# Patient Record
Sex: FEMALE | Race: BLACK OR AFRICAN AMERICAN | Employment: UNEMPLOYED | URBAN - METROPOLITAN AREA
[De-identification: names, ages, dates, MRNs, and addresses within clinical notes are randomized per-mention and may not be internally consistent; named-entity substitution may affect disease eponyms.]

---

## 2023-01-16 ENCOUNTER — APPOINTMENT (OUTPATIENT)
Dept: GENERAL RADIOLOGY | Age: 38
End: 2023-01-16

## 2023-01-16 ENCOUNTER — HOSPITAL ENCOUNTER (EMERGENCY)
Age: 38
Discharge: LEFT BEFORE DISCHARGE | End: 2023-01-16

## 2023-01-16 VITALS
SYSTOLIC BLOOD PRESSURE: 121 MMHG | RESPIRATION RATE: 18 BRPM | HEART RATE: 87 BPM | DIASTOLIC BLOOD PRESSURE: 70 MMHG | TEMPERATURE: 98.3 F | OXYGEN SATURATION: 100 %

## 2023-01-16 DIAGNOSIS — R51.9 FREQUENT HEADACHES: ICD-10-CM

## 2023-01-16 DIAGNOSIS — R30.0 DYSURIA: ICD-10-CM

## 2023-01-16 DIAGNOSIS — M25.551 RIGHT HIP PAIN: Primary | ICD-10-CM

## 2023-01-16 LAB
BILIRUBIN URINE: NEGATIVE MG/DL
BLOOD, URINE: NEGATIVE
CLARITY: CLEAR
COLOR: YELLOW
GLUCOSE, URINE: NEGATIVE MG/DL
INTERPRETATION: NORMAL
KETONES, URINE: NEGATIVE MG/DL
LEUKOCYTE ESTERASE, URINE: NEGATIVE
NITRITE URINE, QUANTITATIVE: NEGATIVE
PH, URINE: 7 (ref 5–8)
PREGNANCY, URINE: NEGATIVE
PROTEIN UA: NEGATIVE MG/DL
SPECIFIC GRAVITY UA: 1.01 (ref 1–1.03)
SPECIFIC GRAVITY, URINE: 1.01 (ref 1–1.03)
UROBILINOGEN, URINE: NORMAL MG/DL (ref 0.2–1)

## 2023-01-16 PROCEDURE — 73502 X-RAY EXAM HIP UNI 2-3 VIEWS: CPT

## 2023-01-16 PROCEDURE — 81003 URINALYSIS AUTO W/O SCOPE: CPT

## 2023-01-16 PROCEDURE — 99284 EMERGENCY DEPT VISIT MOD MDM: CPT

## 2023-01-16 PROCEDURE — 87491 CHLMYD TRACH DNA AMP PROBE: CPT

## 2023-01-16 PROCEDURE — 6370000000 HC RX 637 (ALT 250 FOR IP): Performed by: PHYSICIAN ASSISTANT

## 2023-01-16 PROCEDURE — 87591 N.GONORRHOEAE DNA AMP PROB: CPT

## 2023-01-16 PROCEDURE — 81025 URINE PREGNANCY TEST: CPT

## 2023-01-16 RX ORDER — ACETAMINOPHEN 500 MG
1000 TABLET ORAL ONCE
Status: COMPLETED | OUTPATIENT
Start: 2023-01-16 | End: 2023-01-16

## 2023-01-16 RX ADMIN — ACETAMINOPHEN 1000 MG: 500 TABLET ORAL at 20:26

## 2023-01-16 ASSESSMENT — PAIN SCALES - GENERAL: PAINLEVEL_OUTOF10: 6

## 2023-01-16 NOTE — ED NOTES
Pt presents to ED due to c/o leg pain \"ever since she got her ankle monitor off\". Pt is also feeling fatigued and possible STD exposure.       Tresa Verdin RN  01/16/23 1640

## 2023-01-16 NOTE — ED NOTES
Right leg pain after ankle monitor removed 3 months ago and daily headaches     Kyree Camp, SIDRA  01/16/23 3837 FRANK Saravia RN  01/16/23 1397

## 2023-01-17 NOTE — ED PROVIDER NOTES
Emergency [address] EMERGENCY DEPARTMENT    Patient: Judith Chacon  MRN: [id number]  : 1985  Date of Evaluation: 2023  ED Provider: Matt Nieto PA-C    Chief Complaint       Chief Complaint   Patient presents with    Leg Pain    Headache    Exposure to STD       Matthew Gaspar is a 40 y.o. female who presents to the emergency department for multiple complaints. History obtained using  #[id number]. Patient complains first of headaches and body aches for the last 4-5 months. She states headache is frontal, worse at night, keeps her from sleeping. She has not taken any medications at home to try to alleviate it. Denies fever, chills, nasal congestion, sore throat, cough. Denies n/v/d. Second complaint is right hip pain. Onset was months ago after having her ankle monitor removed. Denies any fall or injury. Pain is over the lateral hip. Worse with lying on that side. Denies radiation down the leg, numbness, tingling. Last complaint is dysuria. Onset about a week ago. Denies vaginal bleeding or discharge. Denies frequency or urgency. Denies abd pain or flank pain. Her significant other is being seen for an STD check as well but patient tells me she doesn't understand what a sexually transmitted disease is or if she has any concerns for such. ROS     CONSTITUTIONAL:  Denies fever.  + body aches. HEAD:  + headache. ENT:  Denies earache, nasal congestion, sore throat. NECK:  Denies neck pain. RESPIRATORY:  Denies any shortness of breath. CARDIOVASCULAR:  Denies chest pain. GI:  Denies nausea or vomiting. :  + dysuria. MUSCULOSKELETAL:  + hip pain. BACK:  Denies back pain. INTEGUMENT:  Denies skin changes. LYMPHATIC:  Denies lymphadenopathy. NEUROLOGIC:  Denies any numbness/tingling. Past History   No past medical history on file. No past surgical history on file.   Social History Socioeconomic History    Marital status:        Medications/Allergies   There are no discharge medications for this patient. No Known Allergies     Physical Exam       ED Triage Vitals [01/16/23 1616]   BP Temp Temp Source Heart Rate Resp SpO2 Height Weight   121/70 98.3 °F (36.8 °C) Oral 87 18 100 % -- --     GENERAL APPEARANCE:  Well-developed, well-nourished, no acute distress. HEAD:  NC/AT. EYES:  Sclera anicteric. ENT:  Ears, nose, mouth normal.     NECK:  Supple. CARDIO:  RRR. LUNGS:   CTAB. Respirations unlabored. ABDOMEN:  Soft, non-distended, non-tender. BS active. BACK:  No midline thoracic or lumbar spinal tenderness. EXTREMITIES:  No acute deformities. SKIN:  Warm and dry. NEUROLOGICAL:  Alert and oriented. PSYCHIATRIC:  Normal mood. Diagnostics     Labs:  Results for orders placed or performed during the hospital encounter of 01/16/23   Urinalysis   Result Value Ref Range    Color, UA YELLOW YELLOW    Clarity, UA CLEAR CLEAR    Glucose, Urine NEGATIVE NEGATIVE MG/DL    Bilirubin Urine NEGATIVE NEGATIVE MG/DL    Ketones, Urine NEGATIVE NEGATIVE MG/DL    Specific Gravity, UA 1.015 1.001 - 1.035    Blood, Urine NEGATIVE NEGATIVE    pH, Urine 7.0 5.0 - 8.0    Protein, UA NEGATIVE NEGATIVE MG/DL    Urobilinogen, Urine NORMAL 0.2 - 1.0 MG/DL    Nitrite Urine, Quantitative NEGATIVE NEGATIVE    Leukocyte Esterase, Urine NEGATIVE NEGATIVE   HCG, Urine, Qualitative, Pregnancy (Lab)   Result Value Ref Range    Pregnancy, Urine NEGATIVE NEGATIVE    Specific Gravity, Urine 1.015 1.001 - 1.035    Interpretation HCG METHOD LIMITATIONS:        Radiographs:  XR HIP 2-3 VW W PELVIS RIGHT    Result Date: 1/16/2023  EXAMINATION: ONE XRAY VIEW OF THE PELVIS AND TWO XRAY VIEWS RIGHT HIP 1/16/2023 9:00 pm COMPARISON: None.  HISTORY: ORDERING SYSTEM PROVIDED HISTORY: pain TECHNOLOGIST PROVIDED HISTORY: Reason for exam:->pain Reason for Exam:  pain Additional signs and symptoms: none Relevant Medical/Surgical History: none FINDINGS: Three views of the pelvis and right hip were reviewed. No acute fracture identified. Anatomic alignment of the bilateral hips. Joint spaces are preserved. 1. No acute osseous abnormality of the pelvis or right hip identified. 2. No significant degenerative change. ED Course and MDM     CC/HPI Summary, DDx, ED Course, and Reassessment:  Patient presents to the ED with chief complaint of headache, body aches, dysuria, hip pain. Differential diagnosis includes but is not limited to viral illness, migraines, tension headaches, meningitis, others//hip dislocation, fracture, bursitis, others//UTI, STI, others. UA, urine gc/chlamydia, pregnancy ordered as well as hip XR. I was unable to get back in to talk to patient about her results with an  given multiple critically ill patients and she eloped prior to discussion of results. History from : Patient    Limitations to history : Language ([place]  used)    Patient was given the following medications:  Medications   acetaminophen (TYLENOL) tablet 1,000 mg (1,000 mg Oral Given 1/16/23 2026)     Independent Imaging Interpretation by me:  None    Chronic conditions affecting care: None    Discussion with Other Profesionals : None    Social Determinants : None    Records Reviewed : None    Disposition Considerations (tests considered but not done, Shared Decision Making, Pt Expectation of Test or Tx.):   Patient eloped from the department    I am the Primary Clinician of Record. Supervising physician was Dr. Ashli Catalan. Patient was seen independently. In light of current events, I did utilize appropriate PPE (including N95 and surgical face mask, safety glasses, and gloves, as recommended by the health facility/national standard best practice, during my bedside interactions with the patient. Final Impression      1. Right hip pain    2. Frequent headaches    3.  Dysuria DISPOSITION Eloped - Left Before Treatment Complete 01/16/2023 10:29:36 PM      [address] Tiffany Musa, PAADELE  [address] [place] [address]  01/17/23 0423

## 2023-01-17 NOTE — ED NOTES
Unable to locate patient patient eloped with out treatment completed.       Yocasta Johnson RN  01/16/23 3187

## 2023-01-19 LAB
CHLAMYDIA TRACHOMATIS AMPLIFIED DET: NEGATIVE
N GONORRHOEAE AMPLIFIED DET: NEGATIVE

## 2024-07-18 ENCOUNTER — APPOINTMENT (OUTPATIENT)
Dept: GENERAL RADIOLOGY | Age: 39
End: 2024-07-18

## 2024-07-18 ENCOUNTER — HOSPITAL ENCOUNTER (EMERGENCY)
Age: 39
Discharge: HOME | End: 2024-07-18

## 2024-07-18 VITALS
TEMPERATURE: 98.4 F | HEART RATE: 76 BPM | HEIGHT: 67 IN | BODY MASS INDEX: 31.39 KG/M2 | OXYGEN SATURATION: 100 % | SYSTOLIC BLOOD PRESSURE: 92 MMHG | WEIGHT: 200 LBS | DIASTOLIC BLOOD PRESSURE: 81 MMHG | RESPIRATION RATE: 16 BRPM

## 2024-07-18 DIAGNOSIS — R51.9 INTRACTABLE HEADACHE, UNSPECIFIED CHRONICITY PATTERN, UNSPECIFIED HEADACHE TYPE: Primary | ICD-10-CM

## 2024-07-18 DIAGNOSIS — N93.9 ABNORMAL UTERINE BLEEDING: ICD-10-CM

## 2024-07-18 LAB
BACTERIA: NEGATIVE /HPF
BILIRUBIN, URINE: NEGATIVE MG/DL
BLOOD, URINE: ABNORMAL
CLARITY, UA: CLEAR
COLOR, UA: YELLOW
GLUCOSE URINE: NEGATIVE MG/DL
INTERPRETATION: NORMAL
KETONES, URINE: NEGATIVE MG/DL
LEUKOCYTE ESTERASE, URINE: NEGATIVE
MUCUS: ABNORMAL HPF
NITRITE URINE, QUANTITATIVE: NEGATIVE
PH, URINE: 7.5 (ref 5–8)
PREGNANCY, URINE: NEGATIVE
PROTEIN UA: NEGATIVE MG/DL
RBC URINE: 1 /HPF (ref 0–6)
SPECIFIC GRAVITY UA: 1.02 (ref 1–1.03)
SQUAMOUS EPITHELIAL: 1 /HPF
TRICHOMONAS: ABNORMAL /HPF
UROBILINOGEN, URINE: 0.2 MG/DL (ref 0.2–1)
WBC UA: 1 /HPF (ref 0–5)

## 2024-07-18 PROCEDURE — 73502 X-RAY EXAM HIP UNI 2-3 VIEWS: CPT

## 2024-07-18 PROCEDURE — 81001 URINALYSIS AUTO W/SCOPE: CPT

## 2024-07-18 PROCEDURE — 6370000000 HC RX 637 (ALT 250 FOR IP): Performed by: NURSE PRACTITIONER

## 2024-07-18 PROCEDURE — 81025 URINE PREGNANCY TEST: CPT

## 2024-07-18 PROCEDURE — 99284 EMERGENCY DEPT VISIT MOD MDM: CPT

## 2024-07-18 RX ORDER — ACETAMINOPHEN 500 MG
1000 TABLET ORAL ONCE
Status: COMPLETED | OUTPATIENT
Start: 2024-07-18 | End: 2024-07-18

## 2024-07-18 RX ADMIN — ACETAMINOPHEN 1000 MG: 500 TABLET ORAL at 14:42

## 2024-07-18 ASSESSMENT — PAIN DESCRIPTION - DESCRIPTORS: DESCRIPTORS: SQUEEZING

## 2024-07-18 ASSESSMENT — LIFESTYLE VARIABLES
HOW OFTEN DO YOU HAVE A DRINK CONTAINING ALCOHOL: NEVER
HOW MANY STANDARD DRINKS CONTAINING ALCOHOL DO YOU HAVE ON A TYPICAL DAY: PATIENT DOES NOT DRINK

## 2024-07-18 ASSESSMENT — PAIN SCALES - GENERAL
PAINLEVEL_OUTOF10: 8
PAINLEVEL_OUTOF10: 10
PAINLEVEL_OUTOF10: 8

## 2024-07-18 ASSESSMENT — PAIN DESCRIPTION - ORIENTATION: ORIENTATION: ANTERIOR

## 2024-07-18 ASSESSMENT — PAIN - FUNCTIONAL ASSESSMENT
PAIN_FUNCTIONAL_ASSESSMENT: 0-10
PAIN_FUNCTIONAL_ASSESSMENT: 0-10
PAIN_FUNCTIONAL_ASSESSMENT: ACTIVITIES ARE NOT PREVENTED

## 2024-07-18 ASSESSMENT — PAIN DESCRIPTION - LOCATION
LOCATION: FLANK
LOCATION: HEAD

## 2024-07-18 NOTE — DISCHARGE INSTRUCTIONS
[facility]-Med Assist    Nou ka bill peye nilda medikaman ou ak nilda aplike nilda Medicare Maddison D.      [address]   [place] [address]  [phone]      Kòman [facility]-Med Assist kapab bill  Anpil fwa, fanmi ki gen revni fèb missy whiting asire oswa ki manke asire oblije fè yon chwa ant achte medikaman yo ak peye lòt depans yo.    [facility] Assist pa kwè se yon chwa ou ta dwe oblije fè. Nou bill kouvri abdiel medikaman yo avèk de (2) pwogram diferan.     Med Assist kolabore avèk famasi lokal yo nilda kouvri abdiel medikaman ou yo.     Med Assist Plus kowòdone avèk doktè ou ak konpayi medikaman yo nilda bill kouvri abdiel medikaman ki koute chè anpil yo missy whiting kouvri nan Med Assist.      Kijan nilda ou kòmanse  Si ou bezwen èd nilda peye nilda medikaman yo oswa nilda ou aplike nilda Medicare Maddison D, rele nou nan [phone].    Nou pral egzamine sitiyasyon ou epi diskite norman fason nou kapab bill. Mete ou prè nilda ou ranpli yon aplikasyon nilda finansman epi nilda bay verifikasyon revni fwaye a, depans yo, lis medikaman aktyèl yo ak lòt dokiman anplis, selon sèvis ki nesesè yo.      Nilda jwenn plis enfòmasyon oswa nilda pran yon randevou, rele nou nan [phone].      Yon ministè saeid [facility]

## 2024-07-18 NOTE — ED TRIAGE NOTES
Patient to triage with multiple complaints. Patient states she has been having a headache for approx. 2 months. Also states her periods have been light the past 2 months. Also states she is having pain in her left flank area. Also states she is having pain in her right thigh. Resps even and unlabored.

## 2024-07-18 NOTE — ED PROVIDER NOTES
[facility] EMERGENCY DEPARTMENT  EMERGENCY DEPARTMENT ENCOUNTER      Pt Name: Tayla Ayers  MRN: [id number]  Birthdate 1985  Date of evaluation: 7/18/2024  Provider: ERIN White CNP  PCP: No primary care provider on file.  Note Started: 2:13 PM EDT 7/18/24    I am the Primary Clinician of Record.  MAGDALENA. I have evaluated this patient.    CHIEF COMPLAINT       Chief Complaint   Patient presents with    Headache     States started approx. 2 months ago.      HISTORY OF PRESENT ILLNESS: 1 or more Elements   Tayla Ayers is a 39 y.o. female presents to the emergency department for a headache that has been ongoing for the past 2 months with associated back pain occasional.  Patient reports the location of the headache is frontal.  Associated symptoms include  photophobia. She has not taken any medications for her headache.   It is rated 2/10. It came on gradually and is not the worst headache of the patient's life. There was no thunderclap presentation. The patient has a history of headaches  Denies head trauma or loss of consciousness, neck pain, neck stiffness, denies vision loss or blurred vision, denies any parenthesis, denies general or unilateral weakness.  She further reports her last 2 periods have been very light with associated lower abd pain abd cramping. She would like that to be evaluated today.  She is not having any abdominal pain at this time.  She denies dysuria, no hematuria, normal bowel movements.  She has had no nausea, no vomiting, no diarrhea, no fevers.  Lastly she reports right hip pain she states that it has been been ongoing.  Again no medication for the discomfort.  Patient was noted to ambulate without difficulty into the emergency department.    I have reviewed the nursing triage documentation and agree unless otherwise noted.    REVIEW OF SYSTEMS :      CONSTITUTIONAL:  Denies fever, chills, weight loss or weakness  EYES:

## 2024-11-29 ENCOUNTER — APPOINTMENT (OUTPATIENT)
Dept: GENERAL RADIOLOGY | Age: 39
End: 2024-11-29

## 2024-11-29 ENCOUNTER — APPOINTMENT (OUTPATIENT)
Dept: ULTRASOUND IMAGING | Age: 39
End: 2024-11-29

## 2024-11-29 ENCOUNTER — HOSPITAL ENCOUNTER (EMERGENCY)
Age: 39
Discharge: HOME | End: 2024-11-30

## 2024-11-29 VITALS
HEART RATE: 90 BPM | RESPIRATION RATE: 18 BRPM | TEMPERATURE: 98.6 F | SYSTOLIC BLOOD PRESSURE: 121 MMHG | HEIGHT: 66 IN | OXYGEN SATURATION: 100 % | DIASTOLIC BLOOD PRESSURE: 88 MMHG | BODY MASS INDEX: 34.1 KG/M2 | WEIGHT: 212.2 LBS

## 2024-11-29 DIAGNOSIS — M77.12 LATERAL EPICONDYLITIS OF LEFT ELBOW: ICD-10-CM

## 2024-11-29 DIAGNOSIS — Z32.01 POSITIVE PREGNANCY TEST: Primary | ICD-10-CM

## 2024-11-29 LAB
ANION GAP SERPL CALCULATED.3IONS-SCNC: 13 MMOL/L (ref 9–17)
BASOPHILS # BLD: 0.01 K/UL
BASOPHILS NFR BLD: 0 % (ref 0–1)
BILIRUB UR QL STRIP: NEGATIVE
BUN SERPL-MCNC: 17 MG/DL (ref 7–20)
CALCIUM SERPL-MCNC: 9.6 MG/DL (ref 8.3–10.6)
CHLORIDE SERPL-SCNC: 104 MMOL/L (ref 99–110)
CLARITY UR: CLEAR
CO2 SERPL-SCNC: 21 MMOL/L (ref 21–32)
COLOR UR: YELLOW
COMMENT: ABNORMAL
CREAT SERPL-MCNC: 0.8 MG/DL (ref 0.6–1.1)
EOSINOPHIL # BLD: 0.12 K/UL
EOSINOPHILS RELATIVE PERCENT: 1 % (ref 0–3)
ERYTHROCYTE [DISTWIDTH] IN BLOOD BY AUTOMATED COUNT: 13.2 % (ref 11.7–14.9)
GFR, ESTIMATED: 82 ML/MIN/1.73M2
GLUCOSE SERPL-MCNC: 90 MG/DL (ref 74–99)
GLUCOSE UR STRIP-MCNC: NEGATIVE MG/DL
HCG UR QL: POSITIVE
HCT VFR BLD AUTO: 34.7 % (ref 37–47)
HGB BLD-MCNC: 11 G/DL (ref 12.5–16)
HGB UR QL STRIP.AUTO: NEGATIVE
IMM GRANULOCYTES # BLD AUTO: 0.02 K/UL
IMM GRANULOCYTES NFR BLD: 0 %
INR PPP: 1.1
KETONES UR STRIP-MCNC: NEGATIVE MG/DL
LEUKOCYTE ESTERASE UR QL STRIP: NEGATIVE
LYMPHOCYTES NFR BLD: 3.14 K/UL
LYMPHOCYTES RELATIVE PERCENT: 32 % (ref 24–44)
MCH RBC QN AUTO: 26.3 PG (ref 27–31)
MCHC RBC AUTO-ENTMCNC: 31.7 G/DL (ref 32–36)
MCV RBC AUTO: 83 FL (ref 78–100)
MONOCYTES NFR BLD: 0.64 K/UL
MONOCYTES NFR BLD: 7 % (ref 0–4)
NEUTROPHILS NFR BLD: 60 % (ref 36–66)
NEUTS SEG NFR BLD: 5.83 K/UL
NITRITE UR QL STRIP: NEGATIVE
PH UR STRIP: 6 [PH] (ref 5–8)
PLATELET # BLD AUTO: 280 K/UL (ref 140–440)
PMV BLD AUTO: 10.2 FL (ref 7.5–11.1)
POTASSIUM SERPL-SCNC: 3.9 MMOL/L (ref 3.5–5.1)
PROT UR STRIP-MCNC: NEGATIVE MG/DL
PROTHROMBIN TIME: 14.3 SEC (ref 11.7–14.5)
RBC # BLD AUTO: 4.18 M/UL (ref 4.2–5.4)
SODIUM SERPL-SCNC: 137 MMOL/L (ref 136–145)
SP GR UR STRIP: 1.01 (ref 1–1.03)
UROBILINOGEN UR STRIP-ACNC: 2 EU/DL (ref 0–1)
WBC OTHER # BLD: 9.8 K/UL (ref 4–10.5)

## 2024-11-29 PROCEDURE — 99284 EMERGENCY DEPT VISIT MOD MDM: CPT

## 2024-11-29 PROCEDURE — 80048 BASIC METABOLIC PNL TOTAL CA: CPT

## 2024-11-29 PROCEDURE — 85610 PROTHROMBIN TIME: CPT

## 2024-11-29 PROCEDURE — 6370000000 HC RX 637 (ALT 250 FOR IP): Performed by: NURSE PRACTITIONER

## 2024-11-29 PROCEDURE — 73080 X-RAY EXAM OF ELBOW: CPT

## 2024-11-29 PROCEDURE — 93971 EXTREMITY STUDY: CPT

## 2024-11-29 PROCEDURE — 81003 URINALYSIS AUTO W/O SCOPE: CPT

## 2024-11-29 PROCEDURE — 85025 COMPLETE CBC W/AUTO DIFF WBC: CPT

## 2024-11-29 PROCEDURE — 84703 CHORIONIC GONADOTROPIN ASSAY: CPT

## 2024-11-29 RX ORDER — LIDOCAINE 4 G/G
1 PATCH TOPICAL DAILY
Status: DISCONTINUED | OUTPATIENT
Start: 2024-11-30 | End: 2024-11-30

## 2024-11-29 RX ORDER — ACETAMINOPHEN 500 MG
1000 TABLET ORAL ONCE
Status: COMPLETED | OUTPATIENT
Start: 2024-11-29 | End: 2024-11-29

## 2024-11-29 RX ADMIN — ACETAMINOPHEN 1000 MG: 500 TABLET ORAL at 23:40

## 2024-11-29 ASSESSMENT — LIFESTYLE VARIABLES
HOW MANY STANDARD DRINKS CONTAINING ALCOHOL DO YOU HAVE ON A TYPICAL DAY: PATIENT DOES NOT DRINK
HOW OFTEN DO YOU HAVE A DRINK CONTAINING ALCOHOL: NEVER

## 2024-11-29 ASSESSMENT — ENCOUNTER SYMPTOMS
BACK PAIN: 0
SHORTNESS OF BREATH: 0

## 2024-11-29 ASSESSMENT — PAIN SCALES - GENERAL: PAINLEVEL_OUTOF10: 9

## 2024-11-29 NOTE — ED PROVIDER NOTES
[facility] EMERGENCY DEPARTMENT  EMERGENCY DEPARTMENT ENCOUNTER      Pt Name: Tayla Ayers  MRN: [id number]  Birthdate 1985  Date of evaluation: 2024  Provider: ERIN White CNP  PCP: No primary care provider on file.  Note Started: 6:44 PM EST 24    I am the Primary Clinician of Record.  MAGDALENA. I have evaluated this patient.      CHIEF COMPLAINT       Chief Complaint   Patient presents with    Arm Pain     X2 weeks left    Pregnancy Test    Menstrual Problem       HISTORY OF PRESENT ILLNESS: 1 or more Elements   History from : Patient    Limitations to history : Language non-English-speaking English Creole therefore professional interpretation services were utilized for today's visit to help bridge the communication barrier.    Tayla Ayers is a 39 y.o. female who presents to the emergency department with left arm pain that has been increasing over the past several days.  She denies any known injury.  She does not do a lot of heavy lifting.  She denies swelling however she cannot bend at the elbow and the pain goes from the elbow into the upper arm.  She denies any past medical history.  She also reports concern that her period is 7 days late and she often has irregular periods.  She would like to have a pregnancy test done.  She denies any vaginal bleeding, urinary discomfort hematuria.  Prior to this visit she is  3 para 3.    Nursing Notes were all reviewed and agreed with or any disagreements were addressed in the HPI.    REVIEW OF SYSTEMS :    Review of Systems   Constitutional:  Negative for fatigue and fever.   Respiratory:  Negative for shortness of breath.    Cardiovascular:  Negative for chest pain.   Genitourinary:  Positive for menstrual problem.   Musculoskeletal:  Positive for arthralgias (left elbow) and myalgias. Negative for back pain and joint swelling.   Skin:  Negative for wound.     Positives and Pertinent  below findings read by radiologist and agree with interpretation:    Interpretation [facility] Radiologist below, if available at the time of this note:    Vascular duplex upper extremity venous left   Final Result   No evidence of venous thrombus in the left upper extremity         Electronically signed by Narendra Hackett      XR ELBOW LEFT (MIN 3 VIEWS)   Final Result   Negative left elbow         Electronically signed by Roge Gama          PROCEDURES   Unless otherwise noted below, none     Procedures    CRITICAL CARE TIME       PAST MEDICAL HISTORY   Chronic Conditions:  has no past medical history on file.     CC/HPI Summary, DDx, ED Course, and Reassessment:         Chart review shows recent radiograph(s):  No results found.    Vitals:    Vitals:    11/29/24 1659 11/29/24 1706 11/29/24 2337   BP: (!) 141/7  121/88   Pulse: (!) 101  90   Resp: 18     Temp: 98.6 °F (37 °C)     TempSrc: Oral     SpO2: 100%     Weight:  96.3 kg (212 lb 3.2 oz)    Height:  1.676 m (5' 6\")         Patient seen and examined.  Work-up initiated secondary to presentation, physical exam findings, vital signs and medical chart review. Emergent etiologies considered.    Differential diagnosis include, but not limited to,     Tayla Ayers is a 39 y.o. female who present to the emergency center for arm pain and concerns of pregnancy as noted above.  Pt has easy nonlabored respirations.  She does have borderline tachycardia at 101 as well as borderline hypertension at 141/70.  Vital signs within acceptable parameters.  Patient is afebrile and in no acute distress. Pt hemodynamically stable and neurovascularly intact.    Is this patient to be included in the SEP-1 Core Measure due to severe sepsis or septic shock?   No   Exclusion criteria - the patient is NOT to be included for SEP-1 Core Measure due to:  2+ SIRS criteria are not met     Labs and imaging obtained, IV established.     5 out of 5 strength bilateral upper extremities.  Patient

## 2024-11-29 NOTE — ED TRIAGE NOTES
Complaining of left arm pain for 2 weeks, worse in the elbow and has been getting progressively worse to where movement hurts. Denies injury.     Also says that her period is 7 days late. Patient says she was seen for a menstrual problem a few months ago, has not been able to get into OBGYN. Was wondering if we could help with the issue, requested pregnancy test as well.

## 2024-11-30 PROCEDURE — 6370000000 HC RX 637 (ALT 250 FOR IP): Performed by: NURSE PRACTITIONER

## 2024-11-30 RX ORDER — LIDOCAINE 4 G/G
1 PATCH TOPICAL DAILY
Status: DISCONTINUED | OUTPATIENT
Start: 2024-11-30 | End: 2024-11-30 | Stop reason: HOSPADM

## 2024-11-30 ASSESSMENT — PAIN - FUNCTIONAL ASSESSMENT: PAIN_FUNCTIONAL_ASSESSMENT: 0-10

## 2024-11-30 ASSESSMENT — PAIN SCALES - GENERAL: PAINLEVEL_OUTOF10: 9

## 2024-12-28 ENCOUNTER — APPOINTMENT (OUTPATIENT)
Dept: ULTRASOUND IMAGING | Age: 39
End: 2024-12-28

## 2024-12-28 ENCOUNTER — HOSPITAL ENCOUNTER (EMERGENCY)
Age: 39
Discharge: HOME | End: 2024-12-28

## 2024-12-28 VITALS
RESPIRATION RATE: 18 BRPM | SYSTOLIC BLOOD PRESSURE: 112 MMHG | TEMPERATURE: 98.8 F | DIASTOLIC BLOOD PRESSURE: 62 MMHG | HEART RATE: 78 BPM | OXYGEN SATURATION: 100 %

## 2024-12-28 DIAGNOSIS — O20.0 THREATENED MISCARRIAGE IN EARLY PREGNANCY: ICD-10-CM

## 2024-12-28 DIAGNOSIS — N93.9 VAGINAL BLEEDING: Primary | ICD-10-CM

## 2024-12-28 LAB
ABO + RH BLD: NORMAL
ALBUMIN SERPL-MCNC: 3.9 G/DL (ref 3.4–5)
ALBUMIN/GLOB SERPL: 1.1 {RATIO} (ref 1.1–2.2)
ALP SERPL-CCNC: 47 U/L (ref 40–129)
ALT SERPL-CCNC: 13 U/L (ref 10–40)
ANION GAP SERPL CALCULATED.3IONS-SCNC: 11 MMOL/L (ref 9–17)
AST SERPL-CCNC: 21 U/L (ref 15–37)
B-HCG SERPL EIA 3RD IS-ACNC: NORMAL MIU/ML
BASOPHILS # BLD: 0.01 K/UL
BASOPHILS NFR BLD: 0 % (ref 0–1)
BILIRUB SERPL-MCNC: 0.4 MG/DL (ref 0–1)
BILIRUB UR QL STRIP: NEGATIVE
BUN SERPL-MCNC: 10 MG/DL (ref 7–20)
CALCIUM SERPL-MCNC: 9.3 MG/DL (ref 8.3–10.6)
CHLORIDE SERPL-SCNC: 108 MMOL/L (ref 99–110)
CLARITY UR: CLEAR
CO2 SERPL-SCNC: 20 MMOL/L (ref 21–32)
COLOR UR: YELLOW
CREAT SERPL-MCNC: 0.5 MG/DL (ref 0.6–1.1)
EOSINOPHIL # BLD: 0.11 K/UL
EOSINOPHILS RELATIVE PERCENT: 2 % (ref 0–3)
EPI CELLS #/AREA URNS HPF: 3 /HPF
ERYTHROCYTE [DISTWIDTH] IN BLOOD BY AUTOMATED COUNT: 13.2 % (ref 11.7–14.9)
GFR, ESTIMATED: >90 ML/MIN/1.73M2
GLUCOSE SERPL-MCNC: 87 MG/DL (ref 74–99)
GLUCOSE UR STRIP-MCNC: NEGATIVE MG/DL
HCT VFR BLD AUTO: 36 % (ref 37–47)
HGB BLD-MCNC: 11.1 G/DL (ref 12.5–16)
HGB UR QL STRIP.AUTO: ABNORMAL
IMM GRANULOCYTES # BLD AUTO: 0.01 K/UL
IMM GRANULOCYTES NFR BLD: 0 %
KETONES UR STRIP-MCNC: NEGATIVE MG/DL
LEUKOCYTE ESTERASE UR QL STRIP: NEGATIVE
LYMPHOCYTES NFR BLD: 1.87 K/UL
LYMPHOCYTES RELATIVE PERCENT: 33 % (ref 24–44)
MCH RBC QN AUTO: 26.3 PG (ref 27–31)
MCHC RBC AUTO-ENTMCNC: 30.8 G/DL (ref 32–36)
MCV RBC AUTO: 85.3 FL (ref 78–100)
MONOCYTES NFR BLD: 0.46 K/UL
MONOCYTES NFR BLD: 8 % (ref 0–4)
MUCOUS THREADS URNS QL MICRO: ABNORMAL
NEUTROPHILS NFR BLD: 57 % (ref 36–66)
NEUTS SEG NFR BLD: 3.27 K/UL
NITRITE UR QL STRIP: NEGATIVE
PH UR STRIP: 7 [PH] (ref 5–8)
PLATELET # BLD AUTO: 272 K/UL (ref 140–440)
PMV BLD AUTO: 10.3 FL (ref 7.5–11.1)
POTASSIUM SERPL-SCNC: 4 MMOL/L (ref 3.5–5.1)
PROT SERPL-MCNC: 7.3 G/DL (ref 6.4–8.2)
PROT UR STRIP-MCNC: NEGATIVE MG/DL
RBC # BLD AUTO: 4.22 M/UL (ref 4.2–5.4)
RBC #/AREA URNS HPF: <1 /HPF (ref 0–2)
SODIUM SERPL-SCNC: 139 MMOL/L (ref 136–145)
SP GR UR STRIP: 1.02 (ref 1–1.03)
UROBILINOGEN UR STRIP-ACNC: 0.2 EU/DL (ref 0–1)
WBC #/AREA URNS HPF: <1 /HPF (ref 0–5)
WBC OTHER # BLD: 5.7 K/UL (ref 4–10.5)

## 2024-12-28 PROCEDURE — 81001 URINALYSIS AUTO W/SCOPE: CPT

## 2024-12-28 PROCEDURE — 76817 TRANSVAGINAL US OBSTETRIC: CPT

## 2024-12-28 PROCEDURE — 86900 BLOOD TYPING SEROLOGIC ABO: CPT

## 2024-12-28 PROCEDURE — 93975 VASCULAR STUDY: CPT

## 2024-12-28 PROCEDURE — 76857 US EXAM PELVIC LIMITED: CPT

## 2024-12-28 PROCEDURE — 80053 COMPREHEN METABOLIC PANEL: CPT

## 2024-12-28 PROCEDURE — 84702 CHORIONIC GONADOTROPIN TEST: CPT

## 2024-12-28 PROCEDURE — 85025 COMPLETE CBC W/AUTO DIFF WBC: CPT

## 2024-12-28 PROCEDURE — 86901 BLOOD TYPING SEROLOGIC RH(D): CPT

## 2024-12-28 PROCEDURE — 99284 EMERGENCY DEPT VISIT MOD MDM: CPT

## 2024-12-28 RX ORDER — ACETAMINOPHEN 325 MG/1
650 TABLET ORAL ONCE
Status: DISCONTINUED | OUTPATIENT
Start: 2024-12-28 | End: 2024-12-28 | Stop reason: HOSPADM

## 2024-12-28 ASSESSMENT — PAIN SCALES - GENERAL: PAINLEVEL_OUTOF10: 0

## 2024-12-28 ASSESSMENT — PAIN - FUNCTIONAL ASSESSMENT: PAIN_FUNCTIONAL_ASSESSMENT: 0-10

## 2024-12-28 NOTE — ED PROVIDER NOTES
EMERGENCY DEPARTMENT ENCOUNTER      PCP: No primary care provider on file.    CHIEF COMPLAINT    Chief Complaint   Patient presents with    Vaginal Bleeding     Positive pregnancy test at home.      This patient was not evaluated by the attending physician.  I have independently evaluated this patient .     HPI    Tayla Ayers is a 39 y.o. female who is G4, P3 stating that she had a recent positive pregnancy test at the end of November, says her last normal menstruation period was the end of October.  Has an upcoming OB/GYN appointment at the first week of the new year.  Patient said that she was taking a bath today when she had noticed some blood from the vaginal area-appearing that she is on her menstruation.  States that she has had some mild bleeding but no significant passing of clots or hemorrhaging.  No urinary symptoms.  Has no abdominal pain, no flank pains, no back pain.  Patient has no history of miscarriage.  No history of medical issues or complicated OB/GYN issues.    REVIEW OF SYSTEMS    General: No fevers or chills  : No dysuria or flank pain  GI: No vomiting or diarrhea.    Pulmonary: No difficulty breathing or cough  Neurologic: No lightheadedness, loss of consciousness or syncope  Skin: No spontaneous bruises.  Lymphatics: No swollen lymph nodes.    All other review of systems are negative  See HPI and nursing notes for additional information       PAST MEDICAL & SURGICAL HISTORY    No past medical history on file.  No past surgical history on file.    CURRENT MEDICATIONS        ALLERGIES    No Known Allergies    FAMILY AND SOCIAL HISTORY    No family history on file.  Social History     Socioeconomic History    Marital status:        PHYSICAL EXAM    VITAL SIGNS: BP (!) 110/53   Pulse 86   Temp 98.8 °F (37.1 °C) (Oral)   Resp 22   SpO2 100%    Constitutional:  Well-developed, well-nourished, appears comfortable  Eyes:  Non-icteric sclera, no conjunctival injection, Pink palpebral  conjunctiva.   HENT:  Atraumatic, external nose normal.   NECK: Supple, no JVD   Respiratory:  No respiratory distress, normal breath sounds   Cardiovascular:  Normal rate, no murmurs  GI:  Normoactive BS.  Abdominal is soft, there is no tenderness, guarding.  No rebound.  No massess.  :   No CVA tenderness  Integument:  Nondiaphoretic Skin, no obvious rashes  Neurologic: Awake and oriented, no slurred speech    LABS:  Results for orders placed or performed during the hospital encounter of 12/28/24   CBC with Auto Differential   Result Value Ref Range    WBC 5.7 4.0 - 10.5 k/uL    RBC 4.22 4.20 - 5.40 m/uL    Hemoglobin 11.1 (L) 12.5 - 16.0 g/dL    Hematocrit 36.0 (L) 37.0 - 47.0 %    MCV 85.3 78.0 - 100.0 fL    MCH 26.3 (L) 27.0 - 31.0 pg    MCHC 30.8 (L) 32.0 - 36.0 g/dL    RDW 13.2 11.7 - 14.9 %    Platelets 272 140 - 440 k/uL    MPV 10.3 7.5 - 11.1 fL    Neutrophils % 57 36 - 66 %    Lymphocytes % 33 24 - 44 %    Monocytes % 8 (H) 0 - 4 %    Eosinophils % 2 0 - 3 %    Basophils % 0 0 - 1 %    Immature Granulocytes % 0 0 %    Neutrophils Absolute 3.27 k/uL    Lymphocytes Absolute 1.87 k/uL    Monocytes Absolute 0.46 k/uL    Eosinophils Absolute 0.11 k/uL    Basophils Absolute 0.01 k/uL    Immature Granulocytes Absolute 0.01 k/uL   Comprehensive Metabolic Panel   Result Value Ref Range    Sodium 139 136 - 145 mmol/L    Potassium 4.0 3.5 - 5.1 mmol/L    Chloride 108 99 - 110 mmol/L    CO2 20 (L) 21 - 32 mmol/L    Anion Gap 11 9 - 17 mmol/L    Glucose 87 74 - 99 mg/dL    BUN 10 7 - 20 mg/dL    Creatinine 0.5 (L) 0.6 - 1.1 mg/dL    Est, Glom Filt Rate >90 >60 mL/min/1.73m2    Calcium 9.3 8.3 - 10.6 mg/dL    Total Protein 7.3 6.4 - 8.2 g/dL    Albumin 3.9 3.4 - 5.0 g/dL    Albumin/Globulin Ratio 1.1 1.1 - 2.2    Total Bilirubin 0.4 0.0 - 1.0 mg/dL    Alkaline Phosphatase 47 40 - 129 U/L    ALT 13 10 - 40 U/L    AST 21 15 - 37 U/L   HCG Serum, Quantitative   Result Value Ref Range    hCG Quant 19,900.0 mIU/mL

## 2024-12-28 NOTE — CARE COORDINATION
CM review of pt chart for discharge needs for o/p resources and follow up. Community resource information placed in pt AVS. CHELSEARN/CM

## 2024-12-28 NOTE — DISCHARGE INSTRUCTIONS
OB/GYN,[facility]  Dr Brayden Christiansen, DO,FACOG  Dr Vladimir Werner MD, FACOG  Joyce Luevano, YOCASTA Wallace NP  Located at [address] [address]  Call to schedule appt. [phone]  Also at [address]  [place] [address]      Dr Eligio Vuong DO  Call to schedule appt. [phone]  [address] [facility] [address]  Takes most insurances.  ____________________________________________  [facility]  [address] [place] [address]  Call for Appt [phone]  _____________________________________________________________________________________  Sèvis nan [facility]  2-1-1:   rosetta [place]  [phone]  2-1-1  [email or web address]    Diana Brandt:  yi ambrosio gadri sibvansyone, [facility]  [phone]    [facility]:   carlos baker rad ak mèb  [phone] or [phone]    Danae olivas:   carlos baker rad ak mèb  [phone]    [facility]:     [facility]  [address] [place] [address]  [phone]     luis brandt yo  [address] [place] [address]  [phone]     [facility]:   klinik sante pedyatrik, klinik sante granmoun ak peman dapre revni ou  [address] [place], [place]   [phone] o [phone]    Moise cordero Presangelina:  oscar nilda barb  [facility]: [phone]  [email or web address]: [facility]scar westMetropolitan State Hospitaldimitri   [facility]: [facility] sante pedyatrik, [facility] sante granmoun ak peman selon revni ou  [address]   [place] [address]  [phone]    [facility]:  Nitrisyon nilda monteroni  [address] [place], [place] [address]  [phone]    [facility]:  ofri sèvis otobis [facility]. ADA ofri sèvis pòt an pòt nilda louis gen  [facility]  [phone]    Eric janetbeverly vwayaj:    [phone]      Ken [facility], Asiselene Jay norman referans-[facility], [address] and [address]  Kahlil 4:00pm-7:00PM  Mekredi 4:00pm-7:00pm  Samdi 9:00am-12:00pm

## 2024-12-30 ENCOUNTER — APPOINTMENT (OUTPATIENT)
Dept: ULTRASOUND IMAGING | Age: 39
End: 2024-12-30

## 2024-12-30 ENCOUNTER — HOSPITAL ENCOUNTER (EMERGENCY)
Age: 39
Discharge: HOME | End: 2024-12-30

## 2024-12-30 VITALS
HEART RATE: 96 BPM | SYSTOLIC BLOOD PRESSURE: 111 MMHG | OXYGEN SATURATION: 100 % | TEMPERATURE: 98.8 F | RESPIRATION RATE: 14 BRPM | DIASTOLIC BLOOD PRESSURE: 69 MMHG

## 2024-12-30 DIAGNOSIS — O03.9 COMPLETE MISCARRIAGE: ICD-10-CM

## 2024-12-30 DIAGNOSIS — N93.9 VAGINA BLEEDING: Primary | ICD-10-CM

## 2024-12-30 LAB
B-HCG SERPL EIA 3RD IS-ACNC: 9489 MIU/ML
BASOPHILS # BLD: 0.01 K/UL
BASOPHILS NFR BLD: 0 % (ref 0–1)
EOSINOPHIL # BLD: 0.08 K/UL
EOSINOPHILS RELATIVE PERCENT: 1 % (ref 0–3)
ERYTHROCYTE [DISTWIDTH] IN BLOOD BY AUTOMATED COUNT: 13.2 % (ref 11.7–14.9)
HCT VFR BLD AUTO: 33.7 % (ref 37–47)
HGB BLD-MCNC: 10.6 G/DL (ref 12.5–16)
IMM GRANULOCYTES # BLD AUTO: 0.03 K/UL
IMM GRANULOCYTES NFR BLD: 0 %
LYMPHOCYTES NFR BLD: 2.12 K/UL
LYMPHOCYTES RELATIVE PERCENT: 25 % (ref 24–44)
MCH RBC QN AUTO: 26.6 PG (ref 27–31)
MCHC RBC AUTO-ENTMCNC: 31.5 G/DL (ref 32–36)
MCV RBC AUTO: 84.5 FL (ref 78–100)
MONOCYTES NFR BLD: 0.52 K/UL
MONOCYTES NFR BLD: 6 % (ref 0–4)
NEUTROPHILS NFR BLD: 68 % (ref 36–66)
NEUTS SEG NFR BLD: 5.8 K/UL
PLATELET # BLD AUTO: 264 K/UL (ref 140–440)
PMV BLD AUTO: 10.3 FL (ref 7.5–11.1)
RBC # BLD AUTO: 3.99 M/UL (ref 4.2–5.4)
WBC OTHER # BLD: 8.6 K/UL (ref 4–10.5)

## 2024-12-30 PROCEDURE — 84702 CHORIONIC GONADOTROPIN TEST: CPT

## 2024-12-30 PROCEDURE — 93975 VASCULAR STUDY: CPT

## 2024-12-30 PROCEDURE — 76817 TRANSVAGINAL US OBSTETRIC: CPT

## 2024-12-30 PROCEDURE — 76801 OB US < 14 WKS SINGLE FETUS: CPT

## 2024-12-30 PROCEDURE — 96360 HYDRATION IV INFUSION INIT: CPT

## 2024-12-30 PROCEDURE — 99284 EMERGENCY DEPT VISIT MOD MDM: CPT

## 2024-12-30 PROCEDURE — 85025 COMPLETE CBC W/AUTO DIFF WBC: CPT

## 2024-12-30 PROCEDURE — 2580000003 HC RX 258: Performed by: PHYSICIAN ASSISTANT

## 2024-12-30 RX ORDER — SODIUM CHLORIDE, SODIUM LACTATE, POTASSIUM CHLORIDE, AND CALCIUM CHLORIDE .6; .31; .03; .02 G/100ML; G/100ML; G/100ML; G/100ML
1000 INJECTION, SOLUTION INTRAVENOUS ONCE
Status: COMPLETED | OUTPATIENT
Start: 2024-12-30 | End: 2024-12-30

## 2024-12-30 RX ORDER — FERROUS SULFATE 325(65) MG
325 TABLET ORAL
Qty: 30 TABLET | Refills: 0 | Status: SHIPPED | OUTPATIENT
Start: 2024-12-30

## 2024-12-30 RX ADMIN — SODIUM CHLORIDE, POTASSIUM CHLORIDE, SODIUM LACTATE AND CALCIUM CHLORIDE 1000 ML: 600; 310; 30; 20 INJECTION, SOLUTION INTRAVENOUS at 10:58

## 2024-12-30 ASSESSMENT — PAIN SCALES - GENERAL: PAINLEVEL_OUTOF10: 0

## 2024-12-30 NOTE — ED NOTES
Jessica at bedside to interpret, reviewed plan of care including return to waiting area and US will call patient when ready.

## 2024-12-30 NOTE — ED PROVIDER NOTES
[facility] EMERGENCY DEPARTMENT  EMERGENCY DEPARTMENT ENCOUNTER        Pt Name: Tayla Ayers  MRN: [id number]  Birthdate 1985  Date of evaluation: 2024  Provider: Stephon Zhang PA-C  PCP: No primary care provider on file.  Note Started: 10:12 AM EST 24      MAGDALENA. I have evaluated this patient.        CHIEF COMPLAINT       No chief complaint on file.      HISTORY OF PRESENT ILLNESS: 1 or more Elements     History From: patient  Limitations to history : Language [place] Island[place] Creole, in person  used    Tayla Ayers is a 39 y.o. female who is 7 weeks gestation,  who presents complaining of vaginal bleeding.  Patient reports she had a positive pregnancy test at the end of November.  She was seen in this ER 2 days ago for some spotting and cramping.  Ultrasound then showed IUP with no fetal activity, quant was 19.9K.  Patient went to primary ER overnight, had stable labs.  She states this morning she started having heavier bleeding and has gone through 3 pads in the last 3 hours.  She has not seen OB in clinic yet.  Denies dizziness, syncope, trauma, anticoagulation use, fever.     Nursing Notes were all reviewed and agreed with or any disagreements were addressed in the HPI.    REVIEW OF SYSTEMS :      Review of Systems   All other systems reviewed and are negative.      Positives and Pertinent negatives as per HPI.       PAST MEDICAL HISTORY    has no past medical history on file.     SURGICAL HISTORY   No past surgical history on file.    CURRENTMEDICATIONS       Discharge Medication List as of 2024  3:09 PM          ALLERGIES     Patient has no known allergies.    FAMILYHISTORY     No family history on file.     SOCIAL HISTORY          SCREENINGS                         CIWA Assessment  BP: 111/69  Pulse: 96           PHYSICAL EXAM  1 or more Elements     ED Triage Vitals [24 1000]   BP Systolic BP Percentile Diastolic BP

## 2024-12-30 NOTE — PROGRESS NOTES
Patient/caregivers speak Sammarinese Creole as their preferred language for their healthcare communication. For safe communication, use the [employer]  carts or call:      Cynthia Saenz Senior -Navigator [phone]    General phone: [phone] ( [phone]) Email: [email or web address]      Please always document the use of interpreting services ('s ID number) in your clinical notes.      Our interpreters are available for team members working with limited English proficient (LEP) patients remotely, via phone or video or in person (if needed for special cases).      When using family members to interpret, for the safety of the patient and protection of the communication of both our patient and [facility] staff the VRI or telephonic  should remain on the line to monitor that all communication is accurate and complete. The  should be instructed to notify [facility] staff immediately if there are any inaccuracies.    Thank you,      Daniel Koroma  Senior /Navigator  Language Services Department  [phone]